# Patient Record
Sex: FEMALE | Race: WHITE | NOT HISPANIC OR LATINO | Employment: UNEMPLOYED | URBAN - METROPOLITAN AREA
[De-identification: names, ages, dates, MRNs, and addresses within clinical notes are randomized per-mention and may not be internally consistent; named-entity substitution may affect disease eponyms.]

---

## 2017-01-25 ENCOUNTER — APPOINTMENT (OUTPATIENT)
Dept: WOMENS IMAGING | Facility: HOSPITAL | Age: 57
End: 2017-01-25

## 2017-01-25 PROCEDURE — 77067 SCR MAMMO BI INCL CAD: CPT | Performed by: RADIOLOGY

## 2018-01-29 ENCOUNTER — APPOINTMENT (OUTPATIENT)
Dept: WOMENS IMAGING | Facility: HOSPITAL | Age: 58
End: 2018-01-29

## 2018-01-29 PROCEDURE — 77067 SCR MAMMO BI INCL CAD: CPT | Performed by: RADIOLOGY

## 2019-01-30 ENCOUNTER — APPOINTMENT (OUTPATIENT)
Dept: WOMENS IMAGING | Facility: HOSPITAL | Age: 59
End: 2019-01-30

## 2019-01-30 PROCEDURE — 77067 SCR MAMMO BI INCL CAD: CPT | Performed by: RADIOLOGY

## 2020-02-04 ENCOUNTER — APPOINTMENT (OUTPATIENT)
Dept: WOMENS IMAGING | Facility: HOSPITAL | Age: 60
End: 2020-02-04

## 2020-02-04 PROCEDURE — 77067 SCR MAMMO BI INCL CAD: CPT | Performed by: RADIOLOGY

## 2021-02-09 ENCOUNTER — APPOINTMENT (OUTPATIENT)
Dept: WOMENS IMAGING | Facility: HOSPITAL | Age: 61
End: 2021-02-09

## 2021-02-09 PROCEDURE — 77067 SCR MAMMO BI INCL CAD: CPT | Performed by: RADIOLOGY

## 2022-01-04 ENCOUNTER — CLINICAL SUPPORT (OUTPATIENT)
Dept: GASTROENTEROLOGY | Facility: CLINIC | Age: 62
End: 2022-01-04

## 2022-01-04 ENCOUNTER — TELEPHONE (OUTPATIENT)
Dept: GASTROENTEROLOGY | Facility: CLINIC | Age: 62
End: 2022-01-04

## 2022-01-04 RX ORDER — LISINOPRIL AND HYDROCHLOROTHIAZIDE 20; 12.5 MG/1; MG/1
1 TABLET ORAL DAILY
COMMUNITY

## 2022-01-04 NOTE — PROGRESS NOTES
SPOKE WITH PT ON A DATE FOR COLONOSCOPY. Did direct access appt and patient is wanting to wait on scheduling.

## 2022-01-05 ENCOUNTER — TELEPHONE (OUTPATIENT)
Dept: GASTROENTEROLOGY | Facility: CLINIC | Age: 62
End: 2022-01-05

## 2022-01-05 NOTE — TELEPHONE ENCOUNTER
Mounika from [facility] states that patient is supposed to be scheduled for a colonoscopy and is requesting an egd at the same time. Please advise.

## 2022-01-05 NOTE — TELEPHONE ENCOUNTER
Attempted to contact pt in regards to a reason why needing colonoscopy. Patient does not have a vm.

## 2022-01-10 NOTE — TELEPHONE ENCOUNTER
Patient states the reason for the EGD is GERD.      EGD order placed for a second sign. Please schedule a double when scheduling colonoscopy.

## 2022-01-11 DIAGNOSIS — Z01.818 PRE-OP TESTING: Primary | ICD-10-CM

## 2022-01-11 PROBLEM — K21.9 GASTROESOPHAGEAL REFLUX DISEASE: Status: ACTIVE | Noted: 2022-01-11

## 2022-01-19 NOTE — TELEPHONE ENCOUNTER
Patient has called the office and is requesting to r/s her egd/colon.  Patient was r/s for 04/21/2022 with an arrival time of 0900.   Covid test has been scheduled for 04/15/2022 at 1100.

## 2022-01-26 NOTE — TELEPHONE ENCOUNTER
Patient has called the office and left a vm in regards to procedures. Patient has been rescheduled for 04/28/2022 with an arrival time of 0730.   Covid test has been scheduled for 04/22/2022 at 1400.

## 2022-03-15 ENCOUNTER — APPOINTMENT (OUTPATIENT)
Dept: WOMENS IMAGING | Facility: HOSPITAL | Age: 62
End: 2022-03-15

## 2022-03-15 PROCEDURE — 77067 SCR MAMMO BI INCL CAD: CPT | Performed by: RADIOLOGY

## 2022-03-15 PROCEDURE — 77063 BREAST TOMOSYNTHESIS BI: CPT | Performed by: RADIOLOGY

## 2022-04-12 ENCOUNTER — TELEPHONE (OUTPATIENT)
Dept: GASTROENTEROLOGY | Facility: CLINIC | Age: 62
End: 2022-04-12

## 2022-04-12 RX ORDER — SODIUM, POTASSIUM,MAG SULFATES 17.5-3.13G
1 SOLUTION, RECONSTITUTED, ORAL ORAL EVERY 12 HOURS
Qty: 354 ML | Refills: 0 | Status: SHIPPED | OUTPATIENT
Start: 2022-04-12 | End: 2022-04-28 | Stop reason: HOSPADM

## 2022-04-12 NOTE — TELEPHONE ENCOUNTER
Patient requesting colon prep sent to [facility] in [place]. Prep sent. Instructions mailed to patient.

## 2022-04-21 ENCOUNTER — TELEPHONE (OUTPATIENT)
Dept: GASTROENTEROLOGY | Facility: CLINIC | Age: 62
End: 2022-04-21

## 2022-04-22 ENCOUNTER — LAB (OUTPATIENT)
Dept: LAB | Facility: HOSPITAL | Age: 62
End: 2022-04-22

## 2022-04-22 DIAGNOSIS — Z01.818 PRE-OP TESTING: ICD-10-CM

## 2022-04-22 LAB — SARS-COV-2 RNA PNL SPEC NAA+PROBE: NOT DETECTED

## 2022-04-22 PROCEDURE — U0004 COV-19 TEST NON-CDC HGH THRU: HCPCS

## 2022-04-27 RX ORDER — FLUOXETINE HYDROCHLORIDE 20 MG/1
20 CAPSULE ORAL DAILY
COMMUNITY

## 2022-04-28 ENCOUNTER — ANESTHESIA EVENT (OUTPATIENT)
Dept: GASTROENTEROLOGY | Facility: HOSPITAL | Age: 62
End: 2022-04-28

## 2022-04-28 ENCOUNTER — HOSPITAL ENCOUNTER (OUTPATIENT)
Facility: HOSPITAL | Age: 62
Setting detail: HOSPITAL OUTPATIENT SURGERY
Discharge: HOME | End: 2022-04-28
Attending: INTERNAL MEDICINE | Admitting: INTERNAL MEDICINE

## 2022-04-28 ENCOUNTER — ANESTHESIA (OUTPATIENT)
Dept: GASTROENTEROLOGY | Facility: HOSPITAL | Age: 62
End: 2022-04-28

## 2022-04-28 VITALS
RESPIRATION RATE: 18 BRPM | DIASTOLIC BLOOD PRESSURE: 65 MMHG | HEIGHT: 63 IN | HEART RATE: 74 BPM | TEMPERATURE: 97.8 F | SYSTOLIC BLOOD PRESSURE: 108 MMHG | OXYGEN SATURATION: 95 % | BODY MASS INDEX: 29.3 KG/M2 | WEIGHT: 165.34 LBS

## 2022-04-28 DIAGNOSIS — K21.9 GASTROESOPHAGEAL REFLUX DISEASE, UNSPECIFIED WHETHER ESOPHAGITIS PRESENT: ICD-10-CM

## 2022-04-28 PROCEDURE — 45385 COLONOSCOPY W/LESION REMOVAL: CPT | Performed by: INTERNAL MEDICINE

## 2022-04-28 PROCEDURE — 25010000002 PROPOFOL 10 MG/ML EMULSION: Performed by: NURSE ANESTHETIST, CERTIFIED REGISTERED

## 2022-04-28 PROCEDURE — 88305 TISSUE EXAM BY PATHOLOGIST: CPT | Performed by: INTERNAL MEDICINE

## 2022-04-28 PROCEDURE — 43239 EGD BIOPSY SINGLE/MULTIPLE: CPT | Performed by: INTERNAL MEDICINE

## 2022-04-28 RX ORDER — LIDOCAINE HYDROCHLORIDE 20 MG/ML
INJECTION, SOLUTION EPIDURAL; INFILTRATION; INTRACAUDAL; PERINEURAL AS NEEDED
Status: DISCONTINUED | OUTPATIENT
Start: 2022-04-28 | End: 2022-04-28 | Stop reason: SURG

## 2022-04-28 RX ORDER — PROPOFOL 10 MG/ML
VIAL (ML) INTRAVENOUS AS NEEDED
Status: DISCONTINUED | OUTPATIENT
Start: 2022-04-28 | End: 2022-04-28 | Stop reason: SURG

## 2022-04-28 RX ORDER — SODIUM CHLORIDE, SODIUM LACTATE, POTASSIUM CHLORIDE, CALCIUM CHLORIDE 600; 310; 30; 20 MG/100ML; MG/100ML; MG/100ML; MG/100ML
30 INJECTION, SOLUTION INTRAVENOUS CONTINUOUS
Status: DISCONTINUED | OUTPATIENT
Start: 2022-04-28 | End: 2022-04-28 | Stop reason: HOSPADM

## 2022-04-28 RX ADMIN — SODIUM CHLORIDE, POTASSIUM CHLORIDE, SODIUM LACTATE AND CALCIUM CHLORIDE 30 ML/HR: 600; 310; 30; 20 INJECTION, SOLUTION INTRAVENOUS at 08:05

## 2022-04-28 RX ADMIN — LIDOCAINE HYDROCHLORIDE 50 MG: 20 INJECTION, SOLUTION EPIDURAL; INFILTRATION; INTRACAUDAL; PERINEURAL at 08:51

## 2022-04-28 RX ADMIN — PROPOFOL 175 MCG/KG/MIN: 10 INJECTION, EMULSION INTRAVENOUS at 08:51

## 2022-04-28 RX ADMIN — PROPOFOL 100 MG: 10 INJECTION, EMULSION INTRAVENOUS at 08:51

## 2022-04-28 NOTE — ANESTHESIA POSTPROCEDURE EVALUATION
Patient: Nita Arroyo    Procedure Summary     Date: 04/28/22 Room / Location: [facility] ENDOSCOPY 3 / [facility] ENDOSCOPY    Anesthesia Start: 0849 Anesthesia Stop: 0917    Procedures:       ESOPHAGOGASTRODUODENOSCOPY WITH BIOPSIES (N/A )      COLONOSCOPY WITH POLYPECTOMY (N/A ) Diagnosis:       Gastroesophageal reflux disease, unspecified whether esophagitis present      (Gastroesophageal reflux disease, unspecified whether esophagitis present [K21.9])    Surgeons: Priscilla Linda MD Provider: Ronaldo Ingram MD    Anesthesia Type: general ASA Status: 2          Anesthesia Type: general    Vitals  Vitals Value Taken Time   /65 04/28/22 0937   Temp 36.6 °C (97.8 °F) 04/28/22 0916   Pulse 75 04/28/22 0939   Resp 18 04/28/22 0935   SpO2 96 % 04/28/22 0939   Vitals shown include unvalidated device data.        Post Anesthesia Care and Evaluation    Patient location during evaluation: bedside  Patient participation: complete - patient participated  Level of consciousness: awake  Pain management: adequate  Airway patency: patent  Anesthetic complications: No anesthetic complications  PONV Status: none  Cardiovascular status: acceptable and stable  Respiratory status: acceptable  Hydration status: acceptable    Comments: An Anesthesiologist personally participated in the most demanding procedures (including induction and emergence if applicable) in the anesthesia plan, monitored the course of anesthesia administration at frequent intervals and remained physically present and available for immediate diagnosis and treatment of emergencies.

## 2022-04-29 LAB
CYTO UR: NORMAL
LAB AP CASE REPORT: NORMAL
LAB AP CLINICAL INFORMATION: NORMAL
PATH REPORT.FINAL DX SPEC: NORMAL
PATH REPORT.GROSS SPEC: NORMAL

## 2022-05-03 ENCOUNTER — TELEPHONE (OUTPATIENT)
Dept: GASTROENTEROLOGY | Facility: CLINIC | Age: 62
End: 2022-05-03

## 2022-05-03 NOTE — TELEPHONE ENCOUNTER
----- Message from RODRIGO Carpenter sent at 5/3/2022  2:33 PM EDT -----  Please place patient in colon recall for 5 years.  Stomach biopsy consistent with chronic gastritis.  Esophageal biopsy consistent with reflux esophagitis.  Continue PPI and avoid NSAIDs.  Please make sure patient has follow-up scheduled.

## 2022-05-12 NOTE — TELEPHONE ENCOUNTER
Patient has been notified of results and recommendations. Patient denies follow up appt at this time.

## 2022-10-04 NOTE — ANESTHESIA PREPROCEDURE EVALUATION
Anesthesia Evaluation     Patient summary reviewed and Nursing notes reviewed   no history of anesthetic complications:  NPO Solid Status: > 8 hours  NPO Liquid Status: > 2 hours           Airway   Mallampati: II  TM distance: >3 FB  Neck ROM: full  No difficulty expected  Dental      Pulmonary - negative pulmonary ROS and normal exam    breath sounds clear to auscultation  Cardiovascular - normal exam  Exercise tolerance: good (4-7 METS)    Rhythm: regular  Rate: normal    (+) hypertension,       Neuro/Psych- negative ROS  GI/Hepatic/Renal/Endo    (+)  GERD,      Musculoskeletal (-) negative ROS    Abdominal    Substance History - negative use     OB/GYN negative ob/gyn ROS         Other - negative ROS                       Anesthesia Plan    ASA 2     general       Anesthetic plan, all risks, benefits, and alternatives have been provided, discussed and informed consent has been obtained with: patient and spouse/significant other.        CODE STATUS:       
No

## 2022-12-13 ENCOUNTER — TRANSCRIBE ORDERS (OUTPATIENT)
Dept: ADMINISTRATIVE | Facility: HOSPITAL | Age: 62
End: 2022-12-13

## 2022-12-13 DIAGNOSIS — R94.5 ABNORMAL RESULTS OF LIVER FUNCTION STUDIES: Primary | ICD-10-CM

## 2022-12-22 ENCOUNTER — HOSPITAL ENCOUNTER (OUTPATIENT)
Dept: ULTRASOUND IMAGING | Facility: HOSPITAL | Age: 62
Discharge: HOME | End: 2022-12-22
Admitting: NURSE PRACTITIONER

## 2022-12-22 DIAGNOSIS — R94.5 ABNORMAL RESULTS OF LIVER FUNCTION STUDIES: ICD-10-CM

## 2022-12-22 PROCEDURE — 76705 ECHO EXAM OF ABDOMEN: CPT

## 2023-01-03 ENCOUNTER — OFFICE VISIT (OUTPATIENT)
Dept: ORTHOPEDIC SURGERY | Facility: CLINIC | Age: 63
End: 2023-01-03
Payer: COMMERCIAL

## 2023-01-03 VITALS — HEIGHT: 63 IN | BODY MASS INDEX: 29.23 KG/M2 | OXYGEN SATURATION: 98 % | WEIGHT: 165 LBS | HEART RATE: 80 BPM

## 2023-01-03 DIAGNOSIS — M65.332 TRIGGER MIDDLE FINGER OF LEFT HAND: Primary | ICD-10-CM

## 2023-01-03 PROCEDURE — 99203 OFFICE O/P NEW LOW 30 MIN: CPT | Performed by: ORTHOPAEDIC SURGERY

## 2023-01-03 PROCEDURE — 20550 NJX 1 TENDON SHEATH/LIGAMENT: CPT | Performed by: ORTHOPAEDIC SURGERY

## 2023-01-03 RX ORDER — PANTOPRAZOLE SODIUM 40 MG/1
40 TABLET, DELAYED RELEASE ORAL DAILY
COMMUNITY
Start: 2022-12-08

## 2023-01-03 RX ORDER — RISEDRONATE SODIUM 150 MG/1
TABLET, FILM COATED ORAL
COMMUNITY
Start: 2022-11-15

## 2023-01-03 RX ADMIN — TRIAMCINOLONE ACETONIDE 40 MG: 40 INJECTION, SUSPENSION INTRA-ARTICULAR; INTRAMUSCULAR at 15:31

## 2023-01-03 RX ADMIN — LIDOCAINE HYDROCHLORIDE 1 ML: 10 INJECTION, SOLUTION INFILTRATION; PERINEURAL at 15:31

## 2023-01-03 NOTE — PROGRESS NOTES
Chief Complaint  Initial Evaluation of the Left Hand     Subjective      Nita Arroyo presents to [facility] for evaluation of the left hand. The patient reports a trigger finger to the left middle finger. She reports locking and catching of the long finger. She denies numbness and tingling. She reports symptoms for over 2 months.     Allergies   Allergen Reactions   • Penicillins Unknown - High Severity   • Sulfa Antibiotics Unknown - High Severity        Social History     Socioeconomic History   • Marital status:    Tobacco Use   • Smoking status: Never   • Smokeless tobacco: Never   Vaping Use   • Vaping Use: Never used   Substance and Sexual Activity   • Alcohol use: Not Currently   • Drug use: Never   • Sexual activity: Defer        Review of Systems     Objective   Vital Signs:   Pulse 80   Ht 160 cm (63\")   Wt 74.8 kg (165 lb)   SpO2 98%   BMI 29.23 kg/m²       Physical Exam  Constitutional:       Appearance: Normal appearance. The patient is well-developed and normal weight.   HENT:      Head: Normocephalic.      Right Ear: Hearing and external ear normal.      Left Ear: Hearing and external ear normal.      Nose: Nose normal.   Eyes:      Conjunctiva/sclera: Conjunctivae normal.   Cardiovascular:      Rate and Rhythm: Normal rate.   Pulmonary:      Effort: Pulmonary effort is normal.      Breath sounds: No wheezing or rales.   Abdominal:      Palpations: Abdomen is soft.      Tenderness: There is no abdominal tenderness.   Musculoskeletal:      Cervical back: Normal range of motion.   Skin:     Findings: No rash.   Neurological:      Mental Status: The patient is alert and oriented to person, place, and time.   Psychiatric:         Mood and Affect: Mood and affect normal.         Judgment: Judgment normal.       Ortho Exam      Left hand- triggering and locking of the long finger. Tender to the A-1 pulley. Otherwise full ROM of the fingers and wrist. Neurovascularly  intact. Sensation to light touch median, radial, ulnar nerve. Positive AIN, PIN, ulnar nerve motor function positive pulses.     Small Joint Arthrocentesis: left 3rd finger  Consent given by: patient  Site marked: site marked  Timeout: Immediately prior to procedure a time out was called to verify the correct patient, procedure, equipment, support staff and site/side marked as required   Supporting Documentation  Indications: pain   Procedure Details  Location: long finger - Long finger joint: left.  Needle gauge: 23g.  Medications administered: 40 mg triamcinolone acetonide 40 MG/ML; 1 mL lidocaine 1 %  Patient tolerance: patient tolerated the procedure well with no immediate complications            Imaging Results (Most Recent)     None           Result Review :       US Liver    Result Date: 12/22/2022  Narrative: PROCEDURE: US LIVER  COMPARISON: None  INDICATIONS: INCREASED LFT  FINDINGS:  The liver measures 18.1 cm in length.  There is increased echogenicity which usually indicates hepatic steatosis.  There is no nodularity to indicate hepatic cirrhosis.  There are no focal hepatic masses.  There is normal directional flow in the main portal vein and hepatic veins.  The hepatic artery is patent.  The gallbladder is surgically absent.  The common bile duct caliber is normal measuring 5 mm.  The right kidney is unremarkable.  It measures 11.1 x 4.9 x 4.4 cm.  The pancreatic head and body are normal.  The pancreatic tail was obscured by bowel gas.      Impression:   1. Increased hepatic echogenicity which usually indicates hepatic steatosis. 2. Status post cholecystectomy.      ANDREI PHAM MD       Electronically Signed and Approved By: ANDREI PHAM MD on 12/22/2022 at 16:32                      Assessment and Plan     Diagnoses and all orders for this visit:    1. Trigger middle finger of left hand (Primary)        Discussed the treatment options with the patient, operative vs non-operative. Discussed the risks and  benefits of a left long finger trigger finger injection vs trigger finger release. The patient expressed understanding and wished to proceed with a steroid injection. The patient tolerated the injection well.     Call or return if worsening symptoms.    Follow Up     PRN      Patient was given instructions and counseling regarding her condition or for health maintenance advice. Please see specific information pulled into the AVS if appropriate.     Scribed for Ron Espinosa MD by Maddie Reyes.  01/03/23   15:12 EST    I have personally performed the services described in this document as scribed by the above individual and it is both accurate and complete. Ron Espinosa MD 01/04/23

## 2023-01-04 RX ORDER — LIDOCAINE HYDROCHLORIDE 10 MG/ML
1 INJECTION, SOLUTION INFILTRATION; PERINEURAL
Status: COMPLETED | OUTPATIENT
Start: 2023-01-03 | End: 2023-01-03

## 2023-01-04 RX ORDER — TRIAMCINOLONE ACETONIDE 40 MG/ML
40 INJECTION, SUSPENSION INTRA-ARTICULAR; INTRAMUSCULAR
Status: COMPLETED | OUTPATIENT
Start: 2023-01-03 | End: 2023-01-03

## 2023-05-28 ENCOUNTER — APPOINTMENT (OUTPATIENT)
Dept: GENERAL RADIOLOGY | Facility: HOSPITAL | Age: 63
End: 2023-05-28

## 2023-05-28 ENCOUNTER — HOSPITAL ENCOUNTER (EMERGENCY)
Facility: HOSPITAL | Age: 63
Discharge: HOME | End: 2023-05-28
Attending: EMERGENCY MEDICINE | Admitting: EMERGENCY MEDICINE

## 2023-05-28 VITALS
HEART RATE: 77 BPM | WEIGHT: 240 LBS | TEMPERATURE: 97.9 F | RESPIRATION RATE: 20 BRPM | OXYGEN SATURATION: 96 % | DIASTOLIC BLOOD PRESSURE: 76 MMHG | SYSTOLIC BLOOD PRESSURE: 126 MMHG | BODY MASS INDEX: 42.51 KG/M2

## 2023-05-28 DIAGNOSIS — S42.214A CLOSED NONDISPLACED FRACTURE OF SURGICAL NECK OF RIGHT HUMERUS, UNSPECIFIED FRACTURE MORPHOLOGY, INITIAL ENCOUNTER: Primary | ICD-10-CM

## 2023-05-28 DIAGNOSIS — S42.254A NONDISPLACED FRACTURE OF GREATER TUBEROSITY OF RIGHT HUMERUS, INITIAL ENCOUNTER FOR CLOSED FRACTURE: ICD-10-CM

## 2023-05-28 PROCEDURE — 73060 X-RAY EXAM OF HUMERUS: CPT

## 2023-05-28 PROCEDURE — 73030 X-RAY EXAM OF SHOULDER: CPT

## 2023-05-28 PROCEDURE — 73080 X-RAY EXAM OF ELBOW: CPT

## 2023-05-28 PROCEDURE — 99283 EMERGENCY DEPT VISIT LOW MDM: CPT

## 2023-05-28 RX ORDER — KETOROLAC TROMETHAMINE 30 MG/ML
30 INJECTION, SOLUTION INTRAMUSCULAR; INTRAVENOUS ONCE
Status: DISCONTINUED | OUTPATIENT
Start: 2023-05-28 | End: 2023-05-28

## 2023-05-28 RX ORDER — HYDROCODONE BITARTRATE AND ACETAMINOPHEN 5; 325 MG/1; MG/1
1 TABLET ORAL EVERY 6 HOURS PRN
Qty: 12 TABLET | Refills: 0 | Status: SHIPPED | OUTPATIENT
Start: 2023-05-28

## 2023-05-28 RX ORDER — HYDROCODONE BITARTRATE AND ACETAMINOPHEN 5; 325 MG/1; MG/1
1 TABLET ORAL EVERY 6 HOURS PRN
Status: DISCONTINUED | OUTPATIENT
Start: 2023-05-28 | End: 2023-05-28 | Stop reason: HOSPADM

## 2023-05-28 RX ADMIN — HYDROCODONE BITARTRATE AND ACETAMINOPHEN 1 TABLET: 5; 325 TABLET ORAL at 12:59

## 2023-05-28 NOTE — ED PROVIDER NOTES
Subjective   History of Present Illness    Review of Systems    Past Medical History:   Diagnosis Date   • Hypertension        Allergies   Allergen Reactions   • Penicillins Unknown - High Severity   • Sulfa Antibiotics Unknown - High Severity       Past Surgical History:   Procedure Laterality Date   • CHOLECYSTECTOMY     • COLONOSCOPY      [place]   • COLONOSCOPY N/A 4/28/2022    Procedure: COLONOSCOPY WITH POLYPECTOMY;  Surgeon: Priscilla Linda MD;  Location: [facility] ENDOSCOPY;  Service: Gastroenterology;  Laterality: N/A;  COLON POLYP, DIVERTICULOSIS, HEMORRHOIDS   • ENDOSCOPY N/A 4/28/2022    Procedure: ESOPHAGOGASTRODUODENOSCOPY WITH BIOPSIES;  Surgeon: Priscilla Linda MD;  Location: [facility] ENDOSCOPY;  Service: Gastroenterology;  Laterality: N/A;  HIATAL HERNIA       Family History   Problem Relation Age of Onset   • Malig Hyperthermia Neg Hx        Social History     Socioeconomic History   • Marital status:    Tobacco Use   • Smoking status: Never   • Smokeless tobacco: Never   Vaping Use   • Vaping Use: Never used   Substance and Sexual Activity   • Alcohol use: Not Currently   • Drug use: Never   • Sexual activity: Defer           Objective   Physical Exam    Procedures           ED Course                                           MDM    Final diagnoses:   None       ED Disposition  ED Disposition     None          No follow-up provider specified.       Medication List      No changes were made to your prescriptions during this visit.

## 2023-05-28 NOTE — ED PROVIDER NOTES
Time: 12:33 PM EDT  Date of encounter:  5/28/2023  Independent Historian/Clinical History and Information was obtained by:   Patient  Chief Complaint   Patient presents with   • Arm Injury     Right humerous and elbow       History is limited by: N/A    History of Present Illness:  Patient is a 63 y.o. year old female who presents to the emergency department for evaluation of right shoulder and humerus pain due to a fall that occurred yesterday.  Patient states that she was on her back porch and tripped over something and had her left hand extended and fell onto her right arm.  She denies hitting her head or LOC.  Patient states she began her milligrams of Motrin last night but did not help with her pain.  She has decreased range of motion of her right shoulder.    HPI    Patient Care Team  Primary Care Provider: Timmy Lamas MD    Past Medical History:     Allergies   Allergen Reactions   • Penicillins Unknown - High Severity   • Sulfa Antibiotics Unknown - High Severity     Past Medical History:   Diagnosis Date   • Hypertension      Past Surgical History:   Procedure Laterality Date   • CHOLECYSTECTOMY     • COLONOSCOPY      [place]   • COLONOSCOPY N/A 4/28/2022    Procedure: COLONOSCOPY WITH POLYPECTOMY;  Surgeon: Priscilla Linda MD;  Location: [facility] ENDOSCOPY;  Service: Gastroenterology;  Laterality: N/A;  COLON POLYP, DIVERTICULOSIS, HEMORRHOIDS   • ENDOSCOPY N/A 4/28/2022    Procedure: ESOPHAGOGASTRODUODENOSCOPY WITH BIOPSIES;  Surgeon: Priscilla Linda MD;  Location: [facility] ENDOSCOPY;  Service: Gastroenterology;  Laterality: N/A;  HIATAL HERNIA     Family History   Problem Relation Age of Onset   • Malig Hyperthermia Neg Hx        Home Medications:  Prior to Admission medications    Medication Sig Start Date End Date Taking? Authorizing Provider   BIOTIN MAXIMUM PO Take  by mouth.    Provider, MD Kulwinder   FLUoxetine (PROzac) 20 MG capsule Take 20 mg by mouth Daily.    Provider,  MD Kulwinder   lisinopril-hydrochlorothiazide (PRINZIDE,ZESTORETIC) 20-12.5 MG per tablet Take 1 tablet by mouth Daily.    ProviderKulwinder MD   pantoprazole (PROTONIX) 40 MG EC tablet Take 40 mg by mouth Daily. 12/8/22   Kulwidner Barbosa MD   risedronate (ACTONEL) 150 MG tablet TAKE 1 TABLET BY MOUTH ONCE A MONTH 11/15/22   ProviderKulwinder MD        Social History:   Social History     Tobacco Use   • Smoking status: Never   • Smokeless tobacco: Never   Vaping Use   • Vaping Use: Never used   Substance Use Topics   • Alcohol use: Not Currently   • Drug use: Never         Review of Systems:  Review of Systems   Constitutional: Negative.    HENT: Negative.    Eyes: Negative.    Respiratory: Negative.    Cardiovascular: Negative.    Gastrointestinal: Negative.    Endocrine: Negative.    Genitourinary: Negative.    Musculoskeletal: Positive for arthralgias. Negative for joint swelling.   Skin: Negative.    Allergic/Immunologic: Negative.    Neurological: Negative.    Hematological: Negative.    Psychiatric/Behavioral: Negative.         Physical Exam:  /76 (BP Location: Left arm, Patient Position: Sitting)   Pulse 77   Temp 97.9 °F (36.6 °C) (Oral)   Resp 20   Wt 109 kg (240 lb)   SpO2 96%   BMI 42.51 kg/m²     Physical Exam  Vitals and nursing note reviewed.   Constitutional:       Appearance: Normal appearance.   HENT:      Head: Normocephalic and atraumatic.      Nose: Nose normal.      Mouth/Throat:      Mouth: Mucous membranes are moist.   Eyes:      Extraocular Movements: Extraocular movements intact.      Conjunctiva/sclera: Conjunctivae normal.      Pupils: Pupils are equal, round, and reactive to light.   Cardiovascular:      Rate and Rhythm: Normal rate and regular rhythm.      Heart sounds: Normal heart sounds.   Pulmonary:      Effort: Pulmonary effort is normal.      Breath sounds: Normal breath sounds.   Musculoskeletal:         General: Tenderness present.      Right  shoulder: Tenderness present. No swelling. Decreased range of motion.      Right upper arm: Tenderness present.      Right elbow: No tenderness.      Right hand: Normal capillary refill. Normal pulse.      Cervical back: Normal range of motion and neck supple.   Skin:     General: Skin is warm and dry.      Capillary Refill: Capillary refill takes 2 to 3 seconds.      Findings: No bruising or erythema.   Neurological:      General: No focal deficit present.      Mental Status: She is alert and oriented to person, place, and time.   Psychiatric:         Mood and Affect: Mood normal.         Behavior: Behavior normal.                  Procedures:  Procedures      Medical Decision Making:      Comorbidities that affect care:    Hypertension    External Notes reviewed:    None      The following orders were placed and all results were independently analyzed by me:  Orders Placed This Encounter   Procedures   • XR Humerus Right   • XR Elbow 3+ View Right   • XR Shoulder 2+ View Right       Medications Given in the Emergency Department:  Medications   HYDROcodone-acetaminophen (NORCO) 5-325 MG per tablet 1 tablet (1 tablet Oral Given 5/28/23 1259)        ED Course:    The patient was initially evaluated in the triage area where orders were placed. The patient was later dispositioned by Teresa Reyez PA-C.      The patient was advised to stay for completion of workup which includes but is not limited to communication of labs and radiological results, reassessment and plan. The patient was advised that leaving prior to disposition by a provider could result in critical findings that are not communicated to the patient.          Labs:    Lab Results (last 24 hours)     ** No results found for the last 24 hours. **           Imaging:    XR Shoulder 2+ View Right    Result Date: 5/28/2023  PROCEDURE: XR SHOULDER 2+ VW RIGHT  COMPARISON: None  INDICATIONS: pain, fall yesterday  FINDINGS:  There is a mildly displaced  fracture of the greater tuberosity.  A fracture line is seen extending through the humeral neck which appears nondisplaced.  No additional fracture identified.  No evidence of dislocation.  Mild acromioclavicular and moderate glenohumeral osteoarthritic changes are present.  No focal soft tissue abnormalities identified        Nondisplaced fracture of the humeral neck extending to a mildly displaced fracture of the greater tuberosity.  No evidence of dislocation.    JAMES TANG MD       Electronically Signed and Approved By: JAMES TANG MD on 5/28/2023 at 12:47             XR Humerus Right    Result Date: 5/28/2023  PROCEDURE: XR HUMERUS RIGHT, 5/28/2023, 12:06 XR ELBOW 3+ VW RIGHT, 5/28/2023, 12:18  COMPARISON: None  INDICATIONS: RIGHT ARM PAIN EXTENDING FROM ANTERIOR, PROXIMAL HUMERUS TO ELBOW POST FALL  Findings:   Humerus:  cture or dislocation is identified.  No erosions.  No periostitis.  No suspicious focal lesions identified.  The soft tissues are unremarkable.  Elbow:  Limited evaluation due to positioning.  No definite joint effusion identified.  Mild degenerative changes are present within the medial and lateral compartments.  No acute fracture or dislocation is identified.  No erosions.  No periostitis.  No suspicious focal lesions identified.  The soft tissues are unremarkable.         No acute osseous abnormality of the humerus or the elbow.      JAMES TANG MD       Electronically Signed and Approved By: JAMES TANG MD on 5/28/2023 at 12:21             XR Elbow 3+ View Right    Result Date: 5/28/2023  PROCEDURE: XR HUMERUS RIGHT, 5/28/2023, 12:06 XR ELBOW 3+ VW RIGHT, 5/28/2023, 12:18  COMPARISON: None  INDICATIONS: RIGHT ARM PAIN EXTENDING FROM ANTERIOR, PROXIMAL HUMERUS TO ELBOW POST FALL  Findings:   Humerus:  cture or dislocation is identified.  No erosions.  No periostitis.  No suspicious focal lesions identified.  The soft tissues are unremarkable.  Elbow:  Limited evaluation due to  positioning.  No definite joint effusion identified.  Mild degenerative changes are present within the medial and lateral compartments.  No acute fracture or dislocation is identified.  No erosions.  No periostitis.  No suspicious focal lesions identified.  The soft tissues are unremarkable.         No acute osseous abnormality of the humerus or the elbow.      JAMES TANG MD       Electronically Signed and Approved By: JAMES TANG MD on 5/28/2023 at 12:21                 Differential Diagnosis and Discussion:      Extremity Pain: Differential diagnosis includes but is not limited to soft tissue sprain, tendonitis, tendon injury, dislocation, fracture, deep vein thrombosis, arterial insufficiency, osteoarthritis, bursitis, and ligamentous damage.  Joint Pain: Differential diagnosis includes but is not limited to polyarticular arthritis, gout, tendinitis, hemarthrosis, septic arthritis, rheumatoid arthritis, bursitis, degenerative joint disease, joint effusion, autoimmune disorder, trauma, and occult neoplasm.    All X-rays impressions were independently interpreted by me.    MDM     Patient Care Considerations:    Consultants/Shared Management Plan:    Consultant: I have discussed the case with Dr. Kolb who states sling and in office f/u is fine.     Social Determinants of Health:    Patient has presented with family members who are responsible, reliable and will ensure follow up care.      Disposition and Care Coordination:    Discharged: The patient is suitable and stable for discharge with no need for consideration of observation or admission.    I have explained the patient´s condition, diagnoses and treatment plan based on the information available to me at this time. I have answered questions and addressed any concerns. The patient has a good  understanding of the patient´s diagnosis, condition, and treatment plan as can be expected at this point. The vital signs have been stable. The patient´s condition  is stable and appropriate for discharge from the emergency department.      The patient will pursue further outpatient evaluation with the primary care physician or other designated or consulting physician as outlined in the discharge instructions. They are agreeable to this plan of care and follow-up instructions have been explained in detail. The patient has received these instructions in written format and have expressed an understanding of the discharge instructions. The patient is aware that any significant change in condition or worsening of symptoms should prompt an immediate return to this or the closest emergency department or call to 1.  I have explained discharge medications and the need for follow up with the patient/caretakers. This was also printed in the discharge instructions. Patient was discharged with the following medications and follow up:      Medication List      No changes were made to your prescriptions during this visit.      Escobar Kolb MD  [address] [place] [address]  [phone]    Call          Final diagnoses:   Closed nondisplaced fracture of surgical neck of right humerus, unspecified fracture morphology, initial encounter   Nondisplaced fracture of greater tuberosity of right humerus, initial encounter for closed fracture        ED Disposition     ED Disposition   Discharge    Condition   Stable    Comment   --             This medical record created using voice recognition software.           Teresa Reyez PA-C  05/28/23 9860

## 2023-05-28 NOTE — DISCHARGE INSTRUCTIONS
You have suffered a humeral neck fracture  Please wear sling at all times except to sleep and shower  I have sent pain medication to the pharmacy  You can alternate with ibuprofen    Please call Dr. Kolb's office first thing Tuesday morning schedule follow-up appointment

## 2023-05-30 ENCOUNTER — TELEPHONE (OUTPATIENT)
Dept: ORTHOPEDIC SURGERY | Facility: CLINIC | Age: 63
End: 2023-05-30

## 2023-05-30 NOTE — TELEPHONE ENCOUNTER
PATIENT WENT TO ER ON 05-27-23 WITH A FT RT SHOULDER. NO OPENINGS WITH DR. AGUIRRE TILL 06-05-23. PLEASE ADVISE OK TO DENEEN OR NEEDS TO BE SEEN SOONER

## 2023-05-31 ENCOUNTER — OFFICE VISIT (OUTPATIENT)
Dept: ORTHOPEDIC SURGERY | Facility: CLINIC | Age: 63
End: 2023-05-31

## 2023-05-31 VITALS — WEIGHT: 165 LBS | HEART RATE: 89 BPM | BODY MASS INDEX: 29.23 KG/M2 | HEIGHT: 63 IN | OXYGEN SATURATION: 98 %

## 2023-05-31 DIAGNOSIS — S42.209A NONDISPLACED FRACTURE OF PROXIMAL END OF HUMERUS: Primary | ICD-10-CM

## 2023-05-31 RX ORDER — FLUOXETINE HYDROCHLORIDE 40 MG/1
1 CAPSULE ORAL DAILY
COMMUNITY
Start: 2023-03-06

## 2023-05-31 RX ORDER — TRAMADOL HYDROCHLORIDE 50 MG/1
50 TABLET ORAL EVERY 6 HOURS PRN
Qty: 30 TABLET | Refills: 0 | Status: SHIPPED | OUTPATIENT
Start: 2023-05-31

## 2023-05-31 NOTE — PROGRESS NOTES
"Chief Complaint  Initial Evaluation and Pain of the Right Shoulder    Subjective          Nita Arroyo presents to [facility]    History of Present Illness    Patient presents today for evaluation of right shoulder, nondisplaced proximal humerus fracture. She reports falling Saturday and fell on the shoulder. She was seen with xrays after the injury and placed into a sling.       Allergies   Allergen Reactions   • Penicillins Unknown - High Severity   • Sulfa Antibiotics Unknown - High Severity        Social History     Socioeconomic History   • Marital status:    Tobacco Use   • Smoking status: Never   • Smokeless tobacco: Never   Vaping Use   • Vaping Use: Never used   Substance and Sexual Activity   • Alcohol use: Not Currently   • Drug use: Never   • Sexual activity: Defer        I reviewed the patient's chief complaint, history of present illness, review of systems, past medical history, surgical history, family history, social history, medications, and allergy list.     REVIEW OF SYSTEMS    Constitutional: Denies fevers, chills, weight loss  Cardiovascular: Denies chest pain, shortness of breath  Skin: Denies rashes, acute skin changes  Neurologic: Denies headache, loss of consciousness  MSK: right shoulder      Objective   Vital Signs:   Pulse 89   Ht 160 cm (63\")   Wt 74.8 kg (165 lb)   SpO2 98%   BMI 29.23 kg/m²     Body mass index is 29.23 kg/m².    Physical Exam    General: Alert. No acute distress.   Right Shoulder: sensation intact to axillary nerve distribution, swelling and bruising is mild, palpable pulses, neuro intact, sling intact, compartment soft, nontender of the elbow, forearm, wrist, hand    Procedures    Imaging Results (Most Recent)     None                   Assessment and Plan        XR Shoulder 2+ View Right    Result Date: 5/28/2023  Narrative: PROCEDURE: XR SHOULDER 2+ VW RIGHT  COMPARISON: None  INDICATIONS: pain, fall yesterday  FINDINGS:  " There is a mildly displaced fracture of the greater tuberosity.  A fracture line is seen extending through the humeral neck which appears nondisplaced.  No additional fracture identified.  No evidence of dislocation.  Mild acromioclavicular and moderate glenohumeral osteoarthritic changes are present.  No focal soft tissue abnormalities identified      Impression:   Nondisplaced fracture of the humeral neck extending to a mildly displaced fracture of the greater tuberosity.  No evidence of dislocation.    JAMES TANG MD       Electronically Signed and Approved By: JAMES TANG MD on 5/28/2023 at 12:47             XR Humerus Right    Result Date: 5/28/2023  Narrative: PROCEDURE: XR HUMERUS RIGHT, 5/28/2023, 12:06 XR ELBOW 3+ VW RIGHT, 5/28/2023, 12:18  COMPARISON: None  INDICATIONS: RIGHT ARM PAIN EXTENDING FROM ANTERIOR, PROXIMAL HUMERUS TO ELBOW POST FALL  Findings:   Humerus:  cture or dislocation is identified.  No erosions.  No periostitis.  No suspicious focal lesions identified.  The soft tissues are unremarkable.  Elbow:  Limited evaluation due to positioning.  No definite joint effusion identified.  Mild degenerative changes are present within the medial and lateral compartments.  No acute fracture or dislocation is identified.  No erosions.  No periostitis.  No suspicious focal lesions identified.  The soft tissues are unremarkable.       Impression:   No acute osseous abnormality of the humerus or the elbow.      JAMES TANG MD       Electronically Signed and Approved By: JAMES TANG MD on 5/28/2023 at 12:21             XR Elbow 3+ View Right    Result Date: 5/28/2023  Narrative: PROCEDURE: XR HUMERUS RIGHT, 5/28/2023, 12:06 XR ELBOW 3+ VW RIGHT, 5/28/2023, 12:18  COMPARISON: None  INDICATIONS: RIGHT ARM PAIN EXTENDING FROM ANTERIOR, PROXIMAL HUMERUS TO ELBOW POST FALL  Findings:   Humerus:  cture or dislocation is identified.  No erosions.  No periostitis.  No suspicious focal lesions identified.  The  soft tissues are unremarkable.  Elbow:  Limited evaluation due to positioning.  No definite joint effusion identified.  Mild degenerative changes are present within the medial and lateral compartments.  No acute fracture or dislocation is identified.  No erosions.  No periostitis.  No suspicious focal lesions identified.  The soft tissues are unremarkable.       Impression:   No acute osseous abnormality of the humerus or the elbow.      JAMES TANG MD       Electronically Signed and Approved By: JAMES TANG MD on 5/28/2023 at 12:21                Diagnoses and all orders for this visit:    1. Nondisplaced fracture of proximal end of humerus (Primary)  -     traMADol (ULTRAM) 50 MG tablet; Take 1 tablet by mouth Every 6 (Six) Hours As Needed for Moderate Pain.  Dispense: 30 tablet; Refill: 0        For two weeks she will wear the sling and work on gentle elbow ROM. She will follow-up in 2 weeks with repeat xrays. No reaching, pushing or pulling at this time. She was given Tramadol today.     Call or return if worsening symptoms.    Scribed for Escobar Kolb MD by [facility]  05/31/2023   13:06 EDT         Follow Up       2 weeks    Patient was given instructions and counseling regarding her condition or for health maintenance advice. Please see specific information pulled into the AVS if appropriate.       I have personally performed the services described in this document as scribed by the above individual and it is both accurate and complete.     Escobar Kolb MD  05/31/23  16:25 EDT

## 2023-06-19 ENCOUNTER — OFFICE VISIT (OUTPATIENT)
Dept: ORTHOPEDIC SURGERY | Facility: CLINIC | Age: 63
End: 2023-06-19
Payer: COMMERCIAL

## 2023-06-19 VITALS
WEIGHT: 165 LBS | HEIGHT: 63 IN | BODY MASS INDEX: 29.23 KG/M2 | OXYGEN SATURATION: 95 % | DIASTOLIC BLOOD PRESSURE: 93 MMHG | SYSTOLIC BLOOD PRESSURE: 149 MMHG | HEART RATE: 87 BPM

## 2023-06-19 DIAGNOSIS — S42.209A NONDISPLACED FRACTURE OF PROXIMAL END OF HUMERUS: Primary | ICD-10-CM

## 2023-06-19 RX ORDER — IBUPROFEN 400 MG/1
400 TABLET ORAL EVERY 6 HOURS PRN
COMMUNITY

## 2023-06-19 NOTE — PROGRESS NOTES
"Chief Complaint  Follow-up and Pain of the Right Shoulder    Subjective          Nita Arroyo presents to [facility] for   History of Present Illness    The patient presents here today for follow up evaluation of the right shoulder. The patient has been treating her proximal humerus fracture conservatively. Her original injury was 5/27/23. She has been wearing a sling. She has no other complaints.   Allergies   Allergen Reactions    Penicillins Unknown - High Severity    Sulfa Antibiotics Unknown - High Severity        Social History     Socioeconomic History    Marital status:    Tobacco Use    Smoking status: Never    Smokeless tobacco: Never   Vaping Use    Vaping Use: Never used   Substance and Sexual Activity    Alcohol use: Not Currently    Drug use: Never    Sexual activity: Defer        I reviewed the patient's chief complaint, history of present illness, review of systems, past medical history, surgical history, family history, social history, medications, and allergy list.     REVIEW OF SYSTEMS    Constitutional: Denies fevers, chills, weight loss  Cardiovascular: Denies chest pain, shortness of breath  Skin: Denies rashes, acute skin changes  Neurologic: Denies headache, loss of consciousness  MSK: Right shoulder pain      Objective   Vital Signs:   /93   Pulse 87   Ht 160 cm (63\")   Wt 74.8 kg (165 lb)   SpO2 95%   BMI 29.23 kg/m²     Body mass index is 29.23 kg/m².    Physical Exam    General: Alert. No acute distress.   Right shoulder- Sensation to light touch median, radial, ulnar nerve. Positive AIN, PIN, ulnar nerve motor function positive pulses. Smooth motion bruising and swelling improving. Axillary sensation intact. Full elbow motion.     Procedures    Imaging Results (Most Recent)       Procedure Component Value Units Date/Time    XR Shoulder 2+ View Right [[id number]] Resulted: 06/19/23 1112     Updated: 06/19/23 1112    Narrative:      " Indications: Follow-up right proximal humerus fracture    Views: AP, Grashey, Scap Y right shoulder    Findings: Impacted right proximal humerus fracture again seen.  Alignment   overall stable.  Early callus formation noted.  Glenohumeral joint   reduced.    Comparative Data: Comparative data found and reviewed today                     Assessment and Plan        XR Shoulder 2+ View Right    Result Date: 6/19/2023  Narrative: Indications: Follow-up right proximal humerus fracture Views: AP, Grashey, Scap Y right shoulder Findings: Impacted right proximal humerus fracture again seen.  Alignment overall stable.  Early callus formation noted.  Glenohumeral joint reduced. Comparative Data: Comparative data found and reviewed today    XR Shoulder 2+ View Right    Result Date: 5/28/2023  Narrative: PROCEDURE: XR SHOULDER 2+ VW RIGHT  COMPARISON: None  INDICATIONS: pain, fall yesterday  FINDINGS:  There is a mildly displaced fracture of the greater tuberosity.  A fracture line is seen extending through the humeral neck which appears nondisplaced.  No additional fracture identified.  No evidence of dislocation.  Mild acromioclavicular and moderate glenohumeral osteoarthritic changes are present.  No focal soft tissue abnormalities identified      Impression:   Nondisplaced fracture of the humeral neck extending to a mildly displaced fracture of the greater tuberosity.  No evidence of dislocation.    JAMES TANG MD       Electronically Signed and Approved By: JAMES TANG MD on 5/28/2023 at 12:47             XR Humerus Right    Result Date: 5/28/2023  Narrative: PROCEDURE: XR HUMERUS RIGHT, 5/28/2023, 12:06 XR ELBOW 3+ VW RIGHT, 5/28/2023, 12:18  COMPARISON: None  INDICATIONS: RIGHT ARM PAIN EXTENDING FROM ANTERIOR, PROXIMAL HUMERUS TO ELBOW POST FALL  Findings:   Humerus:  cture or dislocation is identified.  No erosions.  No periostitis.  No suspicious focal lesions identified.  The soft tissues are unremarkable.   Elbow:  Limited evaluation due to positioning.  No definite joint effusion identified.  Mild degenerative changes are present within the medial and lateral compartments.  No acute fracture or dislocation is identified.  No erosions.  No periostitis.  No suspicious focal lesions identified.  The soft tissues are unremarkable.       Impression:   No acute osseous abnormality of the humerus or the elbow.      JAMES TANG MD       Electronically Signed and Approved By: JAMES TANG MD on 5/28/2023 at 12:21             XR Elbow 3+ View Right    Result Date: 5/28/2023  Narrative: PROCEDURE: XR HUMERUS RIGHT, 5/28/2023, 12:06 XR ELBOW 3+ VW RIGHT, 5/28/2023, 12:18  COMPARISON: None  INDICATIONS: RIGHT ARM PAIN EXTENDING FROM ANTERIOR, PROXIMAL HUMERUS TO ELBOW POST FALL  Findings:   Humerus:  cture or dislocation is identified.  No erosions.  No periostitis.  No suspicious focal lesions identified.  The soft tissues are unremarkable.  Elbow:  Limited evaluation due to positioning.  No definite joint effusion identified.  Mild degenerative changes are present within the medial and lateral compartments.  No acute fracture or dislocation is identified.  No erosions.  No periostitis.  No suspicious focal lesions identified.  The soft tissues are unremarkable.       Impression:   No acute osseous abnormality of the humerus or the elbow.      JAMES TANG MD       Electronically Signed and Approved By: JAMES TANG MD on 5/28/2023 at 12:21               Diagnoses and all orders for this visit:    1. Nondisplaced fracture of proximal end of humerus (Primary)  -     XR Shoulder 2+ View Right        Discussed the treatment plan with the patient.  I reviewed the x-rays that were obtained today with the patient. Plan to continue conservative treatment. We discussed activities to avoid with the patient. Home exercises demonstrated in office today.       Will obtain X-Rays of Right shoulder at next visit.     Call or return if  worsening symptoms.    Scribed for Escobar Kolb MD by Maddie Reyes  06/19/2023   10:33 EDT         Follow Up       2 weeks    Patient was given instructions and counseling regarding her condition or for health maintenance advice. Please see specific information pulled into the AVS if appropriate.       I have personally performed the services described in this document as scribed by the above individual and it is both accurate and complete.     Escobar Kolb MD  06/19/23  13:00 EDT

## 2023-07-26 ENCOUNTER — OFFICE VISIT (OUTPATIENT)
Dept: ORTHOPEDIC SURGERY | Facility: CLINIC | Age: 63
End: 2023-07-26
Payer: COMMERCIAL

## 2023-07-26 VITALS
SYSTOLIC BLOOD PRESSURE: 131 MMHG | DIASTOLIC BLOOD PRESSURE: 85 MMHG | HEIGHT: 63 IN | OXYGEN SATURATION: 95 % | WEIGHT: 165 LBS | HEART RATE: 75 BPM | BODY MASS INDEX: 29.23 KG/M2

## 2023-07-26 DIAGNOSIS — S42.209A NONDISPLACED FRACTURE OF PROXIMAL END OF HUMERUS: Primary | ICD-10-CM

## 2023-07-26 DIAGNOSIS — M25.511 RIGHT SHOULDER PAIN, UNSPECIFIED CHRONICITY: ICD-10-CM

## 2023-07-26 NOTE — PROGRESS NOTES
"Chief Complaint  Follow-up and Pain of the Right Shoulder    Subjective          Nita Arroyo presents to [facility]   History of Present Illness    Nita Arroyo presents today for a follow-up of her right shoulder.  Patient has a right proximal humerus fracture that we are treating conservatively.  Initial injury 5/27/2023.  Today, patient states that she is doing okay.  She states that she experiences pain with certain motions.  She has been taking ibuprofen as needed.  She has been doing her home exercises without complications.  Denies new injuries.  Denies numbness or tingling.      Allergies   Allergen Reactions    Penicillins Unknown - High Severity    Sulfa Antibiotics Unknown - High Severity        Social History     Socioeconomic History    Marital status:    Tobacco Use    Smoking status: Never    Smokeless tobacco: Never   Vaping Use    Vaping Use: Never used   Substance and Sexual Activity    Alcohol use: Not Currently    Drug use: Never    Sexual activity: Defer        I reviewed the patient's chief complaint, history of present illness, review of systems, past medical history, surgical history, family history, social history, medications, and allergy list.     REVIEW OF SYSTEMS    Constitutional: Denies fevers, chills, weight loss  Cardiovascular: Denies chest pain, shortness of breath  Skin: Denies rashes, acute skin changes  Neurologic: Denies headache, loss of consciousness  MSK: Right shoulder pain      Objective   Vital Signs:   /85   Pulse 75   Ht 160 cm (63\")   Wt 74.8 kg (165 lb)   SpO2 95%   BMI 29.23 kg/m²     Body mass index is 29.23 kg/m².    Physical Exam    General: Alert. No acute distress.   Right upper extremity: Tenderness to palpation over the bicipital groove.  Nontender to the AC joint.  Slight tenderness to palpation of the upper arm.  Moderate swelling to the upper arm.  Active forward elevation 90.  External rotation 30.  Full elbow " range of motion.  Forearm soft.  Active finger and wrist range of motion.  Thumb opposition intact.  Palmar abduction of the thumb intact.  Sensation intact axillary, median, radial, and ulnar nerve distributions.  Palpable radial pulse.    Procedures    Imaging Results (Most Recent)       Procedure Component Value Units Date/Time    XR Scapula Right [[id number]] Resulted: 07/26/23 1141     Updated: 07/26/23 1141    Narrative:      Indications: Follow-up right proximal humerus fracture    Views: AP and scapular Y right shoulder    Findings: Right proximal humerus impaction fracture is seen.  Fracture   alignment is stable.  New callus has formed at the fracture site.    Glenohumeral joint is reduced.    Comparative Data: Comparative data found and reviewed today.                   Assessment and Plan    Diagnoses and all orders for this visit:    1. Nondisplaced fracture of proximal end of humerus (Primary)    2. Right shoulder pain, unspecified chronicity  -     XR Scapula Right        Nita Arroyo presents today for follow-up of her right proximal humerus fracture that we are treating conservatively.  X-rays reviewed with the patient today.  Patient instructed to continue with her home exercises for range of motion.  Continue to avoid overhead activities.  Patient will continue 5 pound lifting restriction.  Continue with ibuprofen as needed.      Patient will follow up in 3 weeks for reevaluation.  We will obtain new x-rays of the right shoulder at next visit.      Call or return if symptoms worsen or patient has any concerns.       Follow Up   Return in about 3 weeks (around 8/16/2023).  Patient was given instructions and counseling regarding her condition or for health maintenance advice. Please see specific information pulled into the AVS if appropriate.     Macey Hough PA-C  07/26/23  12:51 EDT

## 2023-08-23 ENCOUNTER — OFFICE VISIT (OUTPATIENT)
Dept: ORTHOPEDIC SURGERY | Facility: CLINIC | Age: 63
End: 2023-08-23
Payer: COMMERCIAL

## 2023-08-23 VITALS
OXYGEN SATURATION: 93 % | HEIGHT: 63 IN | DIASTOLIC BLOOD PRESSURE: 89 MMHG | HEART RATE: 74 BPM | BODY MASS INDEX: 29.23 KG/M2 | WEIGHT: 165 LBS | SYSTOLIC BLOOD PRESSURE: 149 MMHG

## 2023-08-23 DIAGNOSIS — M25.511 RIGHT SHOULDER PAIN, UNSPECIFIED CHRONICITY: ICD-10-CM

## 2023-08-23 DIAGNOSIS — S42.209A NONDISPLACED FRACTURE OF PROXIMAL END OF HUMERUS: Primary | ICD-10-CM

## 2023-08-24 NOTE — PROGRESS NOTES
"Chief Complaint  Follow-up of the Right Shoulder    Subjective          Nita Arroyo presents to [facility]   History of Present Illness    Nita Arroyo presents today for a follow-up of her right shoulder.  Patient has a right proximal humerus fracture that we have been treating conservatively with initial injury 5/27/2023.  Today, patient states that she is doing okay.  States that she has been using her shoulder a lot lately due to moving.  She states that she has not performed any overhead activities.  She does have soreness to her shoulder.  Takes ibuprofen as needed.  Denies new injuries.  Denies numbness or tingling.      Allergies   Allergen Reactions    Penicillins Unknown - High Severity    Sulfa Antibiotics Unknown - High Severity        Social History     Socioeconomic History    Marital status:    Tobacco Use    Smoking status: Never    Smokeless tobacco: Never   Vaping Use    Vaping Use: Never used   Substance and Sexual Activity    Alcohol use: Not Currently    Drug use: Never    Sexual activity: Defer        I reviewed the patient's chief complaint, history of present illness, review of systems, past medical history, surgical history, family history, social history, medications, and allergy list.     REVIEW OF SYSTEMS    Constitutional: Denies fevers, chills, weight loss  Cardiovascular: Denies chest pain, shortness of breath  Skin: Denies rashes, acute skin changes  Neurologic: Denies headache, loss of consciousness  MSK: Right shoulder pain      Objective   Vital Signs:   /89   Pulse 74   Ht 160 cm (63\")   Wt 74.8 kg (165 lb)   SpO2 93%   BMI 29.23 kg/mý     Body mass index is 29.23 kg/mý.    Physical Exam    General: Alert. No acute distress.   Right upper extremity: Tenderness to palpation over the proximal humerus.  Active for elevation 130.  External rotation 60.  Elbow range of motion intact.  Forearm soft.  Active finger and wrist range of motion. "  Thumb opposition intact.  Palmar abduction of the thumb intact.  Sensation intact axillary, median, radial, and ulnar nerve distributions.  Palpable radial pulse.    Procedures    Imaging Results (Most Recent)       Procedure Component Value Units Date/Time    XR Shoulder 2+ View Right [[id number]] Resulted: 08/24/23 1251     Updated: 08/24/23 1254    Narrative:      Indications: Follow-up right proximal humerus fracture    Views: AP, Grashey, scapular Y right shoulder    Findings: Right proximal humerus fracture is seen.  Fracture alignment is   stable with callus formation across the fracture line.  Glenohumeral joint   is reduced.    Comparative Data: Comparative data found and reviewed today.                   Assessment and Plan    Diagnoses and all orders for this visit:    1. Nondisplaced fracture of proximal end of humerus (Primary)    2. Right shoulder pain, unspecified chronicity  -     XR Shoulder 2+ View Right        Nita Arroyo presents today for follow-up of right proximal humerus fracture that we are treating conservatively.  X-rays reviewed with the patient today.  Patient instructed to continue with her home exercises.  We discussed formal physical therapy but she like to continue with home exercises.  Continue to progress with range of motion and work on regaining full shoulder range of motion.  Continue to limit lifting, pushing, and pulling at this time.  Okay to take ibuprofen as needed.      Patient will follow up in 4 weeks for reevaluation.  We will obtain new x-rays of the right shoulder at next visit.      Call or return if symptoms worsen or patient has any concerns.       Follow Up   Return in about 4 weeks (around 9/20/2023).  Patient was given instructions and counseling regarding her condition or for health maintenance advice. Please see specific information pulled into the AVS if appropriate.     Macey Hough PA-C  08/24/23  12:54 EDT

## 2023-10-02 ENCOUNTER — OFFICE VISIT (OUTPATIENT)
Dept: ORTHOPEDIC SURGERY | Facility: CLINIC | Age: 63
End: 2023-10-02
Payer: COMMERCIAL

## 2023-10-02 VITALS
HEIGHT: 63 IN | BODY MASS INDEX: 29.23 KG/M2 | HEART RATE: 75 BPM | SYSTOLIC BLOOD PRESSURE: 135 MMHG | WEIGHT: 165 LBS | DIASTOLIC BLOOD PRESSURE: 85 MMHG | OXYGEN SATURATION: 94 %

## 2023-10-02 DIAGNOSIS — M25.511 RIGHT SHOULDER PAIN, UNSPECIFIED CHRONICITY: ICD-10-CM

## 2023-10-02 DIAGNOSIS — S42.209A NONDISPLACED FRACTURE OF PROXIMAL END OF HUMERUS: Primary | ICD-10-CM

## 2023-10-02 NOTE — PROGRESS NOTES
"Chief Complaint  Follow-up of the Right Shoulder    Subjective          Nita Arroyo presents to [facility]   History of Present Illness    Nita Arroyo presents today for a follow-up of her right shoulder.  Patient is a right proximal humerus fracture that we have been treating conservatively with initial injury 5/27/2023.  Today, patient states that she is doing okay.  She reports occasional pain in her upper arm.  She notes swelling in the upper arm.  She has continued with her home exercises noting improvements in range of motion.  States that her strength is okay.  She denies new injuries.  Denies numbness or tingling.      Allergies   Allergen Reactions    Penicillins Unknown - High Severity    Sulfa Antibiotics Unknown - High Severity        Social History     Socioeconomic History    Marital status:    Tobacco Use    Smoking status: Never    Smokeless tobacco: Never   Vaping Use    Vaping Use: Never used   Substance and Sexual Activity    Alcohol use: Not Currently    Drug use: Never    Sexual activity: Defer        I reviewed the patient's chief complaint, history of present illness, review of systems, past medical history, surgical history, family history, social history, medications, and allergy list.     REVIEW OF SYSTEMS    Constitutional: Denies fevers, chills, weight loss  Cardiovascular: Denies chest pain, shortness of breath  Skin: Denies rashes, acute skin changes  Neurologic: Denies headache, loss of consciousness  MSK: Right shoulder pain      Objective   Vital Signs:   /85   Pulse 75   Ht 160 cm (63\")   Wt 74.8 kg (165 lb)   SpO2 94%   BMI 29.23 kg/m²     Body mass index is 29.23 kg/m².    Physical Exam    General: Alert. No acute distress.   Right upper extremity: Tenderness to palpation over the proximal humerus.  Upper arm soft.  Elbow range of motion intact.  Active forward elevation 170.  External rotation 60.  Internal rotation to the back " pocket.  5 out of 5 rotator cuff testing.  Forearm soft.  Active finger and wrist range of motion.  Thumb opposition intact.  Palmar abduction of thumb intact.  Sensation intact axillary, median, deep, and ulnar nerve distributions.  Palpable radial pulse.    Procedures    Imaging Results (Most Recent)       Procedure Component Value Units Date/Time    XR Shoulder 2+ View Right [[id number]] Resulted: 10/02/23 1132     Updated: 10/02/23 1137    Narrative:      Indications: Follow-up right proximal humerus fracture    Views: AP, Grashey, scapular Y right shoulder    Findings: Right proximal humerus fracture is seen.  Fracture stable and   callus formation is seen.  Glenohumeral joint is reduced.    Comparative Data: Comparative data found and reviewed today.               Assessment and Plan    Diagnoses and all orders for this visit:    1. Nondisplaced fracture of proximal end of humerus (Primary)  -     XR Shoulder 2+ View Right    2. Right shoulder pain, unspecified chronicity        Nita Arroyo presents today for follow-up of right proximal humerus fracture that we treated conservatively with initial injury 5/27/2023.  X-rays reviewed with the patient today.  Patient instructed to continue with her home exercises for both range of motion and strength.  Continue progressing gradually as tolerated.      Patient will follow up as needed.      Call or return if symptoms worsen or patient has any concerns.       Follow Up   Return if symptoms worsen or fail to improve.  Patient was given instructions and counseling regarding her condition or for health maintenance advice. Please see specific information pulled into the AVS if appropriate.     Macey Hough PA-C  10/02/23  11:37 EDT

## 2025-03-18 ENCOUNTER — HOSPITAL ENCOUNTER (EMERGENCY)
Facility: HOSPITAL | Age: 65
Discharge: HOME | End: 2025-03-18
Attending: EMERGENCY MEDICINE | Admitting: EMERGENCY MEDICINE
Payer: COMMERCIAL

## 2025-03-18 ENCOUNTER — APPOINTMENT (OUTPATIENT)
Dept: GENERAL RADIOLOGY | Facility: HOSPITAL | Age: 65
End: 2025-03-18
Payer: COMMERCIAL

## 2025-03-18 VITALS
DIASTOLIC BLOOD PRESSURE: 69 MMHG | BODY MASS INDEX: 29.26 KG/M2 | OXYGEN SATURATION: 95 % | WEIGHT: 165.12 LBS | HEIGHT: 63 IN | SYSTOLIC BLOOD PRESSURE: 120 MMHG | TEMPERATURE: 98.4 F | HEART RATE: 76 BPM | RESPIRATION RATE: 20 BRPM

## 2025-03-18 DIAGNOSIS — F41.1 ANXIETY REACTION: Primary | ICD-10-CM

## 2025-03-18 LAB
ALBUMIN SERPL-MCNC: 3.7 G/DL (ref 3.5–5.2)
ALBUMIN/GLOB SERPL: 1.1 G/DL
ALP SERPL-CCNC: 47 U/L (ref 39–117)
ALT SERPL W P-5'-P-CCNC: 65 U/L (ref 1–33)
ANION GAP SERPL CALCULATED.3IONS-SCNC: 10.2 MMOL/L (ref 5–15)
AST SERPL-CCNC: 59 U/L (ref 1–32)
BASOPHILS # BLD AUTO: 0.08 10*3/MM3 (ref 0–0.2)
BASOPHILS NFR BLD AUTO: 1.2 % (ref 0–1.5)
BILIRUB SERPL-MCNC: 0.5 MG/DL (ref 0–1.2)
BUN SERPL-MCNC: 16 MG/DL (ref 8–23)
BUN/CREAT SERPL: 20.3 (ref 7–25)
CALCIUM SPEC-SCNC: 9 MG/DL (ref 8.6–10.5)
CHLORIDE SERPL-SCNC: 101 MMOL/L (ref 98–107)
CO2 SERPL-SCNC: 27.8 MMOL/L (ref 22–29)
CREAT SERPL-MCNC: 0.79 MG/DL (ref 0.57–1)
DEPRECATED RDW RBC AUTO: 42.5 FL (ref 37–54)
EGFRCR SERPLBLD CKD-EPI 2021: 84.7 ML/MIN/1.73
EOSINOPHIL # BLD AUTO: 0.1 10*3/MM3 (ref 0–0.4)
EOSINOPHIL NFR BLD AUTO: 1.5 % (ref 0.3–6.2)
ERYTHROCYTE [DISTWIDTH] IN BLOOD BY AUTOMATED COUNT: 13.7 % (ref 12.3–15.4)
GEN 5 1HR TROPONIN T REFLEX: <6 NG/L
GLOBULIN UR ELPH-MCNC: 3.4 GM/DL
GLUCOSE SERPL-MCNC: 129 MG/DL (ref 65–99)
HCT VFR BLD AUTO: 42.1 % (ref 34–46.6)
HGB BLD-MCNC: 14.8 G/DL (ref 12–15.9)
HOLD SPECIMEN: NORMAL
HOLD SPECIMEN: NORMAL
IMM GRANULOCYTES # BLD AUTO: 0.03 10*3/MM3 (ref 0–0.05)
IMM GRANULOCYTES NFR BLD AUTO: 0.4 % (ref 0–0.5)
LIPASE SERPL-CCNC: 40 U/L (ref 13–60)
LYMPHOCYTES # BLD AUTO: 2.33 10*3/MM3 (ref 0.7–3.1)
LYMPHOCYTES NFR BLD AUTO: 34.1 % (ref 19.6–45.3)
MAGNESIUM SERPL-MCNC: 1.8 MG/DL (ref 1.6–2.4)
MCH RBC QN AUTO: 30.1 PG (ref 26.6–33)
MCHC RBC AUTO-ENTMCNC: 35.2 G/DL (ref 31.5–35.7)
MCV RBC AUTO: 85.7 FL (ref 79–97)
MONOCYTES # BLD AUTO: 0.34 10*3/MM3 (ref 0.1–0.9)
MONOCYTES NFR BLD AUTO: 5 % (ref 5–12)
NEUTROPHILS NFR BLD AUTO: 3.96 10*3/MM3 (ref 1.7–7)
NEUTROPHILS NFR BLD AUTO: 57.8 % (ref 42.7–76)
NRBC BLD AUTO-RTO: 0 /100 WBC (ref 0–0.2)
NT-PROBNP SERPL-MCNC: 63.3 PG/ML (ref 0–900)
PLATELET # BLD AUTO: 223 10*3/MM3 (ref 140–450)
PMV BLD AUTO: 10.3 FL (ref 6–12)
POTASSIUM SERPL-SCNC: 3.6 MMOL/L (ref 3.5–5.2)
PROT SERPL-MCNC: 7.1 G/DL (ref 6–8.5)
QT INTERVAL: 403 MS
QTC INTERVAL: 451 MS
RBC # BLD AUTO: 4.91 10*6/MM3 (ref 3.77–5.28)
SODIUM SERPL-SCNC: 139 MMOL/L (ref 136–145)
TROPONIN T NUMERIC DELTA: NORMAL
TROPONIN T SERPL HS-MCNC: <6 NG/L
WBC NRBC COR # BLD AUTO: 6.84 10*3/MM3 (ref 3.4–10.8)
WHOLE BLOOD HOLD COAG: NORMAL
WHOLE BLOOD HOLD SPECIMEN: NORMAL

## 2025-03-18 PROCEDURE — 84484 ASSAY OF TROPONIN QUANT: CPT | Performed by: EMERGENCY MEDICINE

## 2025-03-18 PROCEDURE — 83735 ASSAY OF MAGNESIUM: CPT | Performed by: EMERGENCY MEDICINE

## 2025-03-18 PROCEDURE — 85025 COMPLETE CBC W/AUTO DIFF WBC: CPT

## 2025-03-18 PROCEDURE — 36415 COLL VENOUS BLD VENIPUNCTURE: CPT

## 2025-03-18 PROCEDURE — 93005 ELECTROCARDIOGRAM TRACING: CPT | Performed by: EMERGENCY MEDICINE

## 2025-03-18 PROCEDURE — 99284 EMERGENCY DEPT VISIT MOD MDM: CPT

## 2025-03-18 PROCEDURE — 83690 ASSAY OF LIPASE: CPT | Performed by: EMERGENCY MEDICINE

## 2025-03-18 PROCEDURE — 80053 COMPREHEN METABOLIC PANEL: CPT | Performed by: EMERGENCY MEDICINE

## 2025-03-18 PROCEDURE — 71045 X-RAY EXAM CHEST 1 VIEW: CPT

## 2025-03-18 PROCEDURE — 93005 ELECTROCARDIOGRAM TRACING: CPT

## 2025-03-18 PROCEDURE — 83880 ASSAY OF NATRIURETIC PEPTIDE: CPT

## 2025-03-18 RX ORDER — ASPIRIN 81 MG/1
324 TABLET, CHEWABLE ORAL ONCE
Status: COMPLETED | OUTPATIENT
Start: 2025-03-18 | End: 2025-03-18

## 2025-03-18 RX ORDER — POTASSIUM CHLORIDE 750 MG/1
10 CAPSULE, EXTENDED RELEASE ORAL DAILY
COMMUNITY

## 2025-03-18 RX ORDER — BUPROPION HYDROCHLORIDE 150 MG/1
150 TABLET ORAL DAILY
COMMUNITY

## 2025-03-18 RX ORDER — HYDROXYZINE HYDROCHLORIDE 25 MG/1
25 TABLET, FILM COATED ORAL NIGHTLY PRN
Qty: 30 TABLET | Refills: 0 | Status: SHIPPED | OUTPATIENT
Start: 2025-03-18

## 2025-03-18 RX ORDER — SODIUM CHLORIDE 0.9 % (FLUSH) 0.9 %
10 SYRINGE (ML) INJECTION AS NEEDED
Status: DISCONTINUED | OUTPATIENT
Start: 2025-03-18 | End: 2025-03-18 | Stop reason: HOSPADM

## 2025-03-18 RX ADMIN — ASPIRIN 324 MG: 81 TABLET, CHEWABLE ORAL at 10:03

## 2025-03-18 NOTE — ED PROVIDER NOTES
Time: 10:09 AM EDT  Date of encounter:  3/18/2025  Independent Historian/Clinical History and Information was obtained by:   Patient    History is limited by: N/A    Chief Complaint: Chest pain      History of Present Illness:  Patient is a 64 y.o. year old female who presents to the emergency department for evaluation of left-sided chest pain that has been constant for the past 1 week.  Patient does state the pain is worse when she is trying to sleep at night.  Nonexertional.  No infectious symptoms.  Patient has been dealing with high degree of stress/anxiety with a death of a family member and multiple other life stressors at the same time.  States she does have a history of anxiety years ago that caused similar symptoms with negative cardiac workup at that time.      Patient Care Team  Primary Care Provider: Timmy Lamas MD    Past Medical History:     Allergies   Allergen Reactions    Penicillins Unknown - High Severity    Sulfa Antibiotics Unknown - High Severity     Past Medical History:   Diagnosis Date    Hypertension      Past Surgical History:   Procedure Laterality Date    CHOLECYSTECTOMY      COLONOSCOPY      [place]    COLONOSCOPY N/A 4/28/2022    Procedure: COLONOSCOPY WITH POLYPECTOMY;  Surgeon: Priscilla Linda MD;  Location: [facility] ENDOSCOPY;  Service: Gastroenterology;  Laterality: N/A;  COLON POLYP, DIVERTICULOSIS, HEMORRHOIDS    ENDOSCOPY N/A 4/28/2022    Procedure: ESOPHAGOGASTRODUODENOSCOPY WITH BIOPSIES;  Surgeon: Priscilla Linda MD;  Location: [facility] ENDOSCOPY;  Service: Gastroenterology;  Laterality: N/A;  HIATAL HERNIA     Family History   Problem Relation Age of Onset    Malig Hyperthermia Neg Hx        Home Medications:  Prior to Admission medications    Medication Sig Start Date End Date Taking? Authorizing Provider   buPROPion XL (WELLBUTRIN XL) 150 MG 24 hr tablet Take 1 tablet by mouth Daily.    Provider, MD Kulwinder   FLUoxetine (PROzac) 40 MG capsule Take  1 capsule by mouth Daily. 3/6/23  Yes Kulwinder Barbosa MD   ibuprofen (ADVIL,MOTRIN) 400 MG tablet Take 1 tablet by mouth Every 6 (Six) Hours As Needed for Mild Pain.    Kulwinder Barbosa MD   lisinopril-hydrochlorothiazide (PRINZIDE,ZESTORETIC) 20-12.5 MG per tablet Take 1 tablet by mouth Daily.   Yes Kulwinder Barbosa MD   pantoprazole (PROTONIX) 40 MG EC tablet Take 1 tablet by mouth Daily. 12/8/22   Kulwinder Barbosa MD   potassium chloride (MICRO-K) 10 MEQ CR capsule Take 1 capsule by mouth Daily.    Kulwinder Barbosa MD   risedronate (ACTONEL) 150 MG tablet TAKE 1 TABLET BY MOUTH ONCE A MONTH 11/15/22  Yes Kulwinder Barbosa MD   traMADol (ULTRAM) 50 MG tablet Take 1 tablet by mouth Every 6 (Six) Hours As Needed for Moderate Pain.  Patient not taking: Reported on 10/2/2023 5/31/23   Escobar Kolb MD        Social History:   Social History     Tobacco Use    Smoking status: Never    Smokeless tobacco: Never   Vaping Use    Vaping status: Never Used   Substance Use Topics    Alcohol use: Not Currently    Drug use: Never         Review of Systems:  Review of Systems   Constitutional:  Negative for chills and fever.   HENT:  Negative for congestion, rhinorrhea and sore throat.    Eyes:  Negative for photophobia.   Respiratory:  Negative for apnea, cough, chest tightness and shortness of breath.    Cardiovascular:  Positive for chest pain. Negative for palpitations.   Gastrointestinal:  Negative for abdominal pain, diarrhea, nausea and vomiting.   Endocrine: Negative.    Genitourinary:  Negative for difficulty urinating and dysuria.   Musculoskeletal:  Negative for back pain, joint swelling and myalgias.   Skin:  Negative for color change and wound.   Allergic/Immunologic: Negative.    Neurological:  Negative for seizures and headaches.   Psychiatric/Behavioral:  The patient is nervous/anxious.    All other systems reviewed and are negative.       Physical Exam:  /88 (BP Location:  "Left arm, Patient Position: Sitting)   Pulse 70   Temp 98.5 °F (36.9 °C) (Oral)   Resp 15   Ht 160 cm (63\")   Wt 74.9 kg (165 lb 2 oz)   SpO2 95%   BMI 29.25 kg/m²     Physical Exam  Vitals and nursing note reviewed.   Constitutional:       General: She is awake.      Appearance: Normal appearance. She is well-developed.   HENT:      Head: Normocephalic and atraumatic.      Nose: Nose normal.      Mouth/Throat:      Mouth: Mucous membranes are moist.   Eyes:      Extraocular Movements: Extraocular movements intact.      Pupils: Pupils are equal, round, and reactive to light.   Cardiovascular:      Rate and Rhythm: Normal rate and regular rhythm.      Heart sounds: Normal heart sounds.   Pulmonary:      Effort: Pulmonary effort is normal. No respiratory distress.      Breath sounds: Normal breath sounds. No wheezing, rhonchi or rales.   Abdominal:      General: Bowel sounds are normal.      Palpations: Abdomen is soft.      Tenderness: There is no abdominal tenderness. There is no guarding or rebound.      Comments: No rigidity   Musculoskeletal:         General: No tenderness. Normal range of motion.      Cervical back: Normal range of motion and neck supple.   Skin:     General: Skin is warm and dry.      Coloration: Skin is not jaundiced.   Neurological:      General: No focal deficit present.      Mental Status: She is alert. Mental status is at baseline.   Psychiatric:         Mood and Affect: Mood normal.                    Medical Decision Making:      Comorbidities that affect care:    Hypertension    External Notes reviewed:    None      The following orders were placed and all results were independently analyzed by me:  Orders Placed This Encounter   Procedures    XR Chest 1 View    [place] Draw    High Sensitivity Troponin T    Comprehensive Metabolic Panel    Lipase    BNP    Magnesium    CBC Auto Differential    High Sensitivity Troponin T 1Hr    NPO Diet NPO Type: Strict NPO    Undress & Gown    " Continuous Pulse Oximetry    Oxygen Therapy- Nasal Cannula; Titrate 1-6 LPM Per SpO2; 90 - 95%    ECG 12 Lead ED Triage Standing Order; Chest Pain    ECG 12 Lead ED Triage Standing Order; Chest Pain    Insert Peripheral IV    CBC & Differential    Green Top (Gel)    Lavender Top    Gold Top - SST    Light Blue Top       Medications Given in the Emergency Department:  Medications   sodium chloride 0.9 % flush 10 mL (has no administration in time range)   aspirin chewable tablet 324 mg (324 mg Oral Given 3/18/25 1003)        ED Course:    ED Course as of 03/18/25 1104   Tue Mar 18, 2025   1015 I have personally interpreted the EKG today and it shows no evidence of any acute ischemia or heart arrhythmia. [RP]      ED Course User Index  [RP] Adrián Lester MD       Labs:    Lab Results (last 24 hours)       Procedure Component Value Units Date/Time    High Sensitivity Troponin T [[id number]]  (Normal) Collected: 03/18/25 0852    Specimen: Blood from Arm, Right Updated: 03/18/25 0943     HS Troponin T <6 ng/L     Narrative:      High Sensitive Troponin T Reference Range:  <14.0 ng/L- Negative Female for AMI  <22.0 ng/L- Negative Male for AMI  >=14 - Abnormal Female indicating possible myocardial injury.  >=22 - Abnormal Male indicating possible myocardial injury.   Clinicians would have to utilize clinical acumen, EKG, Troponin, and serial changes to determine if it is an Acute Myocardial Infarction or myocardial injury due to an underlying chronic condition.         CBC & Differential [[id number]]  (Normal) Collected: 03/18/25 0852    Specimen: Blood from Arm, Right Updated: 03/18/25 0910    Narrative:      The following orders were created for panel order CBC & Differential.  Procedure                               Abnormality         Status                     ---------                               -----------         ------                     CBC Auto Differential[[id number]]        Normal              Final  result                 Please view results for these tests on the individual orders.    Comprehensive Metabolic Panel [[id number]]  (Abnormal) Collected: 03/18/25 0852    Specimen: Blood from Arm, Right Updated: 03/18/25 0943     Glucose 129 mg/dL      BUN 16 mg/dL      Creatinine 0.79 mg/dL      Sodium 139 mmol/L      Potassium 3.6 mmol/L      Chloride 101 mmol/L      CO2 27.8 mmol/L      Calcium 9.0 mg/dL      Total Protein 7.1 g/dL      Albumin 3.7 g/dL      ALT (SGPT) 65 U/L      AST (SGOT) 59 U/L      Alkaline Phosphatase 47 U/L      Total Bilirubin 0.5 mg/dL      Globulin 3.4 gm/dL      A/G Ratio 1.1 g/dL      BUN/Creatinine Ratio 20.3     Anion Gap 10.2 mmol/L      eGFR 84.7 mL/min/1.73     Narrative:      GFR Categories in Chronic Kidney Disease (CKD)      GFR Category          GFR (mL/min/1.73)    Interpretation  G1                     90 or greater         Normal or high (1)  G2                      60-89                Mild decrease (1)  G3a                   45-59                Mild to moderate decrease  G3b                   30-44                Moderate to severe decrease  G4                    15-29                Severe decrease  G5                    14 or less           Kidney failure          (1)In the absence of evidence of kidney disease, neither GFR category G1 or G2 fulfill the criteria for CKD.    eGFR calculation 2021 CKD-EPI creatinine equation, which does not include race as a factor    Lipase [[id number]]  (Normal) Collected: 03/18/25 0852    Specimen: Blood from Arm, Right Updated: 03/18/25 0943     Lipase 40 U/L     BNP [[id number]]  (Normal) Collected: 03/18/25 0852    Specimen: Blood from Arm, Right Updated: 03/18/25 0937     proBNP 63.3 pg/mL     Narrative:      This assay is used as an aid in the diagnosis of individuals suspected of having heart failure. It can be used as an aid in the diagnosis of acute decompensated heart failure (ADHF) in patients presenting with signs and  symptoms of ADHF to the emergency department (ED). In addition, NT-proBNP of <300 pg/mL indicates ADHF is not likely.    Age Range Result Interpretation  NT-proBNP Concentration (pg/mL:      <50             Positive            >450                   Gray                 300-450                    Negative             <300    50-75           Positive            >900                  Gray                300-900                  Negative            <300      >75             Positive            >1800                  Gray                300-1800                  Negative            <300    Magnesium [[id number]]  (Normal) Collected: 03/18/25 0852    Specimen: Blood from Arm, Right Updated: 03/18/25 0943     Magnesium 1.8 mg/dL     CBC Auto Differential [[id number]]  (Normal) Collected: 03/18/25 0852    Specimen: Blood from Arm, Right Updated: 03/18/25 0910     WBC 6.84 10*3/mm3      RBC 4.91 10*6/mm3      Hemoglobin 14.8 g/dL      Hematocrit 42.1 %      MCV 85.7 fL      MCH 30.1 pg      MCHC 35.2 g/dL      RDW 13.7 %      RDW-SD 42.5 fl      MPV 10.3 fL      Platelets 223 10*3/mm3      Neutrophil % 57.8 %      Lymphocyte % 34.1 %      Monocyte % 5.0 %      Eosinophil % 1.5 %      Basophil % 1.2 %      Immature Grans % 0.4 %      Neutrophils, Absolute 3.96 10*3/mm3      Lymphocytes, Absolute 2.33 10*3/mm3      Monocytes, Absolute 0.34 10*3/mm3      Eosinophils, Absolute 0.10 10*3/mm3      Basophils, Absolute 0.08 10*3/mm3      Immature Grans, Absolute 0.03 10*3/mm3      nRBC 0.0 /100 WBC     High Sensitivity Troponin T 1Hr [[id number]] Collected: 03/18/25 1004    Specimen: Blood from Arm, Left Updated: 03/18/25 1040     HS Troponin T <6 ng/L      Troponin T Numeric Delta --     Comment: Unable to calculate.       Narrative:      High Sensitive Troponin T Reference Range:  <14.0 ng/L- Negative Female for AMI  <22.0 ng/L- Negative Male for AMI  >=14 - Abnormal Female indicating possible myocardial injury.  >=22 - Abnormal  Male indicating possible myocardial injury.   Clinicians would have to utilize clinical acumen, EKG, Troponin, and serial changes to determine if it is an Acute Myocardial Infarction or myocardial injury due to an underlying chronic condition.                  Imaging:    XR Chest 1 View  Result Date: 3/18/2025  XR CHEST 1 VW Date of Exam: 3/18/2025 8:50 AM EDT Indication: Chest Pain Triage Protocol Comparison: None available. Findings: Cardiomediastinal silhouette is within normal limits. No focal consolidation. No pleural effusion or pneumothorax. Osseous structures are unremarkable.     Impression: No acute cardiopulmonary findings. Electronically Signed: Demetri Kennedy MD  3/18/2025 9:03 AM EDT  Workstation ID: [id number]        Differential Diagnosis and Discussion:    Chest Pain:  Based on the patient's signs and symptoms, I considered aortic dissection, myocardial infaction, pulmonary embolism, cardiac tamponade, pericarditis, pneumothorax, musculoskeletal chest pain and other differential diagnosis as an etiology of the patient's chest pain.     PROCEDURES:    Labs were collected in the emergency department and all labs were reviewed and interpreted by me.  X-ray were performed in the emergency department and all X-ray impressions were independently interpreted by me.  An EKG was performed and the EKG was interpreted by me.    ECG 12 Lead ED Triage Standing Order; Chest Pain   Preliminary Result   HEART RATE=75  bpm   RR Lsfvdbgk=555  ms   MN Ohnrjmjy=146  ms   P Horizontal Axis=16  deg   P Front Axis=-5  deg   QRSD Interval=77  ms   QT Xavmlwcz=431  ms   RYgN=447  ms   QRS Axis=-4  deg   T Wave Axis=28  deg   - ABNORMAL ECG -   Sinus arrhythmia   Inferior infarct, old   Date and Time of Study:2025-03-18 08:41:20          Procedures    MDM                     Patient Care Considerations:    PERC: I used the PERC score to risk stratify the patient for PE and a CT of the chest was considered but ultimately not  indicated in today's visit.      Consultants/Shared Management Plan:    None    Social Determinants of Health:    Patient is independent, reliable, and has access to care.       Disposition and Care Coordination:    Discharged: I considered escalation of care by admitting this patient to the hospital, however patient admits to stress as a likely trigger.  Her cardiac workup here is negative.    I have explained the patient´s condition, diagnoses and treatment plan based on the information available to me at this time. I have answered questions and addressed any concerns. The patient has a good  understanding of the patient´s diagnosis, condition, and treatment plan as can be expected at this point. The vital signs have been stable. The patient´s condition is stable and appropriate for discharge from the emergency department.      The patient will pursue further outpatient evaluation with the primary care physician or other designated or consulting physician as outlined in the discharge instructions. They are agreeable to this plan of care and follow-up instructions have been explained in detail. The patient has received these instructions in written format and has expressed an understanding of the discharge instructions. The patient is aware that any significant change in condition or worsening of symptoms should prompt an immediate return to this or the closest emergency department or call to 911.    Final diagnoses:   Anxiety reaction        ED Disposition       ED Disposition   Discharge    Condition   Stable    Comment   --               This medical record created using voice recognition software.             Adrián Lester MD  03/18/25 5817

## 2025-03-18 NOTE — DISCHARGE INSTRUCTIONS
Follow-up with your family doctor today.  Return to the emergency department immediately for worsening of symptoms.

## 2025-05-14 ENCOUNTER — OFFICE VISIT (OUTPATIENT)
Dept: ORTHOPEDIC SURGERY | Facility: CLINIC | Age: 65
End: 2025-05-14
Payer: COMMERCIAL

## 2025-05-14 VITALS
SYSTOLIC BLOOD PRESSURE: 126 MMHG | OXYGEN SATURATION: 95 % | BODY MASS INDEX: 29.23 KG/M2 | WEIGHT: 165 LBS | DIASTOLIC BLOOD PRESSURE: 79 MMHG | HEIGHT: 63 IN | HEART RATE: 88 BPM

## 2025-05-14 DIAGNOSIS — S49.91XA INJURY OF BOTH SHOULDERS, INITIAL ENCOUNTER: ICD-10-CM

## 2025-05-14 DIAGNOSIS — M25.512 LEFT SHOULDER PAIN, UNSPECIFIED CHRONICITY: Primary | ICD-10-CM

## 2025-05-14 DIAGNOSIS — M25.511 RIGHT SHOULDER PAIN, UNSPECIFIED CHRONICITY: ICD-10-CM

## 2025-05-14 DIAGNOSIS — S49.92XA INJURY OF BOTH SHOULDERS, INITIAL ENCOUNTER: ICD-10-CM

## 2025-05-14 RX ORDER — CYCLOBENZAPRINE HCL 10 MG
10 TABLET ORAL 3 TIMES DAILY PRN
Qty: 30 TABLET | Refills: 0 | Status: SHIPPED | OUTPATIENT
Start: 2025-05-14
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
## 2025-07-30 ENCOUNTER — OFFICE VISIT (OUTPATIENT)
Dept: ORTHOPEDIC SURGERY | Facility: CLINIC | Age: 65
End: 2025-07-30
Payer: COMMERCIAL

## 2025-07-30 VITALS — HEIGHT: 63 IN | WEIGHT: 165 LBS | BODY MASS INDEX: 29.23 KG/M2

## 2025-07-30 DIAGNOSIS — S49.91XA INJURY OF BOTH SHOULDERS, INITIAL ENCOUNTER: Primary | ICD-10-CM

## 2025-07-30 DIAGNOSIS — S49.92XA INJURY OF BOTH SHOULDERS, INITIAL ENCOUNTER: Primary | ICD-10-CM
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
## (undated) DEVICE — Device: Brand: DEFENDO AIR/WATER/SUCTION AND BIOPSY VALVE

## (undated) DEVICE — COLON KIT: Brand: MEDLINE INDUSTRIES, INC.

## (undated) DEVICE — SINGLE-USE BIOPSY FORCEPS: Brand: RADIAL JAW 4

## (undated) DEVICE — EGD OR ERCP KIT: Brand: MEDLINE INDUSTRIES, INC.

## (undated) DEVICE — THE SINGLE USE ETRAP – POLYP TRAP IS USED FOR SUCTION RETRIEVAL OF ENDOSCOPICALLY REMOVED POLYPS.: Brand: ETRAP

## (undated) DEVICE — SOL IRRG H2O PL/BG 1000ML STRL

## (undated) DEVICE — SNAR POLYP CAPTIFLEX XS/OVL 11X2.4MM 240CM 1P/U